# Patient Record
Sex: FEMALE | Race: WHITE | URBAN - METROPOLITAN AREA
[De-identification: names, ages, dates, MRNs, and addresses within clinical notes are randomized per-mention and may not be internally consistent; named-entity substitution may affect disease eponyms.]

---
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
## 2021-12-22 ENCOUNTER — OFFICE VISIT (OUTPATIENT)
Dept: ORTHOPEDIC SURGERY | Age: 21
End: 2021-12-22
Payer: COMMERCIAL

## 2021-12-22 VITALS — HEIGHT: 66 IN | WEIGHT: 120 LBS | BODY MASS INDEX: 19.29 KG/M2

## 2021-12-22 DIAGNOSIS — M20.12 HALLUX VALGUS, BILATERAL: Primary | ICD-10-CM

## 2021-12-22 DIAGNOSIS — M79.671 RIGHT FOOT PAIN: ICD-10-CM

## 2021-12-22 DIAGNOSIS — M20.11 HALLUX VALGUS, BILATERAL: Primary | ICD-10-CM

## 2021-12-22 PROCEDURE — 99213 OFFICE O/P EST LOW 20 MIN: CPT | Performed by: ORTHOPAEDIC SURGERY

## 2021-12-22 RX ORDER — LEVONORGESTREL AND ETHINYL ESTRADIOL 0.15-0.03
KIT ORAL
COMMUNITY
End: 2021-12-22 | Stop reason: SDUPTHER

## 2021-12-22 NOTE — PROGRESS NOTES
Nadia Cortés (: 2000) is a 22 y.o. female, patient,here for evaluation of the following   Chief Complaint   Patient presents with   Jordana Guzman     bilateral bunion evaluation. right worse than left. initially seen in , where x-rays were taken, but no additional treatment. she states that the pain has increased and she feels as though they are worsening. ASSESSMENT/PLAN:  Below is the assessment and plan developed based on review of pertinent history, physical exam, labs, studies, and medications. 1. Hallux valgus, bilateral  -     XR STANDING FOOT BI COMP 3 V; Future  2. Right foot pain      Patient has a foot that has bilateral bunions and she has had this since childhood, she has seen Dr. Jayna Hernandez in the past for the same problem, surgery was recommended. She was not ready for surgery at that time. Currently the right foot is worse than the left side and she is considering surgery sometime in the near future so here today to find out more information. We briefly discussed the treatments which may require double osteotomies for adolescent type of hallux valgus, procedures can be open or minimally invasive. Informed that minimally invasive works very well for this type of bunion since the incisions are smaller for multiple osteotomies. However I am not doing minimally invasive at this point but have been in the process of learning the procedure over the next few months. I did offer the name of another orthopedic foot and ankle surgeon in town who may know how to perform the minimally invasive procedures. I did provide that orthopedic surgeons name to the patient and they will get another opinion. Otherwise I am happy to see them back if they elect to proceed with surgery in the future, I do not recommend doing both feet but would recommend the worst side only and currently the worst side is the right foot. Return if symptoms worsen or fail to improve.       Allergies   Allergen Reactions    Chickpea Other (comments)    Mold Other (comments)    Peas Other (comments)    Pollen Extracts Other (comments)    Sesame Seed Other (comments)       Current Outpatient Medications   Medication Sig    fexofenadine HCl (ALLEGRA PO) Take  by mouth.  levonorgestrel-ethinyl estradiol (Lillow, 28,) 0.15-0.03 mg tab Take  by mouth. No current facility-administered medications for this visit. Past Medical History:   Diagnosis Date    Scoliosis        Past Surgical History:   Procedure Laterality Date    HX TYMPANOSTOMY         Family History   Problem Relation Age of Onset    Heart Failure Paternal Grandmother     Heart Attack Paternal Grandfather     No Known Problems Mother     No Known Problems Father        Social History     Socioeconomic History    Marital status: SINGLE     Spouse name: Not on file    Number of children: Not on file    Years of education: Not on file    Highest education level: Not on file   Occupational History    Occupation: student     Comment: [employer]   Tobacco Use    Smoking status: Never Smoker    Smokeless tobacco: Never Used   Vaping Use    Vaping Use: Never used   Substance and Sexual Activity    Alcohol use: Not Currently    Drug use: Never    Sexual activity: Not on file   Other Topics Concern    Not on file   Social History Narrative    ** Merged History Encounter **          Social Determinants of Health     Financial Resource Strain:     Difficulty of Paying Living Expenses: Not on file   Food Insecurity:     Worried About [address] in the Last Year: Not on file    Alexey of Food in the Last Year: Not on file   Transportation Needs:     Lack of Transportation (Medical): Not on file    Lack of Transportation (Non-Medical):  Not on file   Physical Activity:     Days of Exercise per Week: Not on file    Minutes of Exercise per Session: Not on file   Stress:     Feeling of Stress : Not on file   Social Connections:     Frequency of Communication with Friends and Family: Not on file    Frequency of Social Gatherings with Friends and Family: Not on file    Attends Sabianism Services: Not on file    Active Member of Clubs or Organizations: Not on file    Attends Club or Organization Meetings: Not on file    Marital Status: Not on file   Intimate Partner Violence:     Fear of Current or Ex-Partner: Not on file    Emotionally Abused: Not on file    Physically Abused: Not on file    Sexually Abused: Not on file   Housing Stability:     Unable to Pay for Housing in the Last Year: Not on file    Number of [place] in the Last Year: Not on file    Unstable Housing in the Last Year: Not on file           Vitals:  Ht 5' 6\" (1.676 m)   Wt 120 lb (54.4 kg)   BMI 19.37 kg/m²    Body mass index is 19.37 kg/m². SUBJECTIVE/OBJECTIVE:  Carmella Cartagena (: 2000)   New patient referred to me for bilateral hallux valgus deformities, the right foot is worse than the left side. This problem has been ongoing since adolescence and has been seen prior in 2013 by Dr. Dariela Loyola at which time he had recommended surgery for both feet. The patient and her parents decided to wait. She is currently having more problems, the pain however is moderate throbbing pain that comes and goes and there is associated numbness and tingling sensations. Walking and running make it worse. She feels it is getting worse especially the right foot. She has tried rest, ice, heat and apparently has changed up the types of shoes she wears. Patient last seen for this in 2013 when x-rays obtained. Patient is not diabetic, non-smoker. Physical Exam  Pleasant well-nourished female , alert and oriented to person, time and place, no acute distress. Normal gait, satisfactory weightbearing stance. Right foot: Hallux valgus is slightly less angulation than the left, but more tender to palpation at the MTP joint and medial eminence. There is mild hypermobility to the joints but the hallux valgus angle and MTP joint has some stiffness to attempted correction to neutral position, no erythema, no fluctuance, no ecchymosis. Left foot: Hallux valgus is slightly more angulation than the right, less tender to palpation at MTP joint rest of foot. Hypermobility similar to contralateral, MTP joint has some stiffness to a lesser extent with more motion for dorsiflexion plantarflexion and correction of toe position to neutral is less stiff. No erythema, no fluctuance, no ecchymosis. Bilateral ankle exam, nontender, no swelling ligaments grossly stable. Neurovascular exam intact for light touch sensation, cap refill, dorsalis pedis pulse palpable, flexion/extension strength 5/5. Skin intact without open wounds, lesions or ulcers, no skin discolorations, normal warmth to skin. Imaging:    XR Results (most recent):  Results from Appointment encounter on 12/22/21    XR STANDING FOOT BI COMP 3 V    Narrative  Bilateral foot standing AP, lateral and oblique x-rays show bilateral hallux valgus, RON approximately 15.25 at left foot, 12.46 at right, MTP joint is incongruent. HVA are over 20 degrees at both right and left foot. Normal bone density, no lesions, no degenerative changes at joints. Reviewed also bilateral foot standing AP x-rays brought by patient from an outside facility, these x-rays show slight increased to the RON, on these old x-rays the left is 15.18, the right is 12.35 degrees. Hallux valgus angle appears about the same may be slightly less by 1 to 2 degrees at most.  The MTP joint congruent is similar. An electronic signature was used to authenticate this note.   -- Mackenzie Spencer MD

## 2022-08-10 PROBLEM — N92.2 PUBERTAL MENORRHAGIA: Status: ACTIVE | Noted: 2018-02-06

## 2022-08-10 PROBLEM — B07.0 VERRUCA PLANTARIS: Status: ACTIVE | Noted: 2022-08-10

## 2023-05-22 RX ORDER — LEVONORGESTREL AND ETHINYL ESTRADIOL 0.15-0.03
KIT ORAL
COMMUNITY